# Patient Record
Sex: MALE | Race: WHITE | URBAN - METROPOLITAN AREA
[De-identification: names, ages, dates, MRNs, and addresses within clinical notes are randomized per-mention and may not be internally consistent; named-entity substitution may affect disease eponyms.]

---

## 2017-01-01 ENCOUNTER — NURSING HOME VISIT (OUTPATIENT)
Dept: GERIATRICS | Facility: CLINIC | Age: 82
End: 2017-01-01
Payer: COMMERCIAL

## 2017-01-01 ENCOUNTER — DOCUMENTATION ONLY (OUTPATIENT)
Dept: OTHER | Facility: CLINIC | Age: 82
End: 2017-01-01

## 2017-01-01 ENCOUNTER — NURSING HOME VISIT (OUTPATIENT)
Dept: GERIATRICS | Facility: CLINIC | Age: 82
End: 2017-01-01
Payer: MEDICARE

## 2017-01-01 ENCOUNTER — CLINICAL UPDATE (OUTPATIENT)
Dept: PHARMACY | Facility: CLINIC | Age: 82
End: 2017-01-01

## 2017-01-01 ENCOUNTER — OFFICE VISIT (OUTPATIENT)
Dept: OTOLARYNGOLOGY | Facility: CLINIC | Age: 82
End: 2017-01-01
Payer: COMMERCIAL

## 2017-01-01 VITALS
RESPIRATION RATE: 16 BRPM | TEMPERATURE: 98 F | WEIGHT: 165.4 LBS | BODY MASS INDEX: 25.07 KG/M2 | DIASTOLIC BLOOD PRESSURE: 70 MMHG | HEART RATE: 76 BPM | SYSTOLIC BLOOD PRESSURE: 140 MMHG | OXYGEN SATURATION: 94 % | HEIGHT: 68 IN

## 2017-01-01 VITALS
OXYGEN SATURATION: 95 % | DIASTOLIC BLOOD PRESSURE: 50 MMHG | RESPIRATION RATE: 17 BRPM | WEIGHT: 162 LBS | BODY MASS INDEX: 24.63 KG/M2 | SYSTOLIC BLOOD PRESSURE: 101 MMHG | HEART RATE: 66 BPM | TEMPERATURE: 98.3 F

## 2017-01-01 VITALS
HEART RATE: 66 BPM | RESPIRATION RATE: 18 BRPM | WEIGHT: 161 LBS | TEMPERATURE: 97.8 F | DIASTOLIC BLOOD PRESSURE: 82 MMHG | OXYGEN SATURATION: 92 % | BODY MASS INDEX: 24.48 KG/M2 | SYSTOLIC BLOOD PRESSURE: 128 MMHG

## 2017-01-01 VITALS
WEIGHT: 168 LBS | SYSTOLIC BLOOD PRESSURE: 132 MMHG | TEMPERATURE: 97.1 F | DIASTOLIC BLOOD PRESSURE: 78 MMHG | OXYGEN SATURATION: 96 % | HEART RATE: 59 BPM | BODY MASS INDEX: 25.54 KG/M2 | RESPIRATION RATE: 17 BRPM

## 2017-01-01 VITALS
BODY MASS INDEX: 24.48 KG/M2 | WEIGHT: 161 LBS | HEART RATE: 64 BPM | SYSTOLIC BLOOD PRESSURE: 118 MMHG | DIASTOLIC BLOOD PRESSURE: 60 MMHG | OXYGEN SATURATION: 95 % | RESPIRATION RATE: 18 BRPM | TEMPERATURE: 96.4 F

## 2017-01-01 VITALS
DIASTOLIC BLOOD PRESSURE: 63 MMHG | WEIGHT: 171 LBS | RESPIRATION RATE: 20 BRPM | HEART RATE: 85 BPM | OXYGEN SATURATION: 95 % | SYSTOLIC BLOOD PRESSURE: 100 MMHG | HEIGHT: 68 IN | TEMPERATURE: 99 F | BODY MASS INDEX: 25.91 KG/M2

## 2017-01-01 VITALS
TEMPERATURE: 98.1 F | OXYGEN SATURATION: 94 % | WEIGHT: 164 LBS | DIASTOLIC BLOOD PRESSURE: 75 MMHG | SYSTOLIC BLOOD PRESSURE: 127 MMHG | BODY MASS INDEX: 24.94 KG/M2 | HEART RATE: 64 BPM | RESPIRATION RATE: 17 BRPM

## 2017-01-01 VITALS
OXYGEN SATURATION: 95 % | BODY MASS INDEX: 24.78 KG/M2 | WEIGHT: 163 LBS | TEMPERATURE: 96.4 F | RESPIRATION RATE: 18 BRPM | HEART RATE: 64 BPM | SYSTOLIC BLOOD PRESSURE: 118 MMHG | DIASTOLIC BLOOD PRESSURE: 60 MMHG

## 2017-01-01 VITALS
RESPIRATION RATE: 17 BRPM | WEIGHT: 170 LBS | DIASTOLIC BLOOD PRESSURE: 58 MMHG | OXYGEN SATURATION: 96 % | TEMPERATURE: 96.8 F | SYSTOLIC BLOOD PRESSURE: 101 MMHG | BODY MASS INDEX: 25.85 KG/M2 | HEART RATE: 74 BPM

## 2017-01-01 VITALS
RESPIRATION RATE: 17 BRPM | OXYGEN SATURATION: 96 % | SYSTOLIC BLOOD PRESSURE: 101 MMHG | HEART RATE: 74 BPM | WEIGHT: 151 LBS | TEMPERATURE: 96.8 F | BODY MASS INDEX: 22.96 KG/M2 | DIASTOLIC BLOOD PRESSURE: 58 MMHG

## 2017-01-01 VITALS
SYSTOLIC BLOOD PRESSURE: 140 MMHG | HEART RATE: 76 BPM | OXYGEN SATURATION: 94 % | RESPIRATION RATE: 16 BRPM | TEMPERATURE: 98 F | WEIGHT: 168 LBS | BODY MASS INDEX: 25.54 KG/M2 | DIASTOLIC BLOOD PRESSURE: 70 MMHG

## 2017-01-01 VITALS
OXYGEN SATURATION: 94 % | RESPIRATION RATE: 17 BRPM | DIASTOLIC BLOOD PRESSURE: 75 MMHG | BODY MASS INDEX: 24.94 KG/M2 | WEIGHT: 164 LBS | HEART RATE: 64 BPM | SYSTOLIC BLOOD PRESSURE: 127 MMHG | TEMPERATURE: 98.1 F

## 2017-01-01 VITALS
SYSTOLIC BLOOD PRESSURE: 129 MMHG | OXYGEN SATURATION: 96 % | RESPIRATION RATE: 18 BRPM | TEMPERATURE: 97.2 F | HEART RATE: 69 BPM | HEIGHT: 68 IN | DIASTOLIC BLOOD PRESSURE: 79 MMHG | BODY MASS INDEX: 22.73 KG/M2 | WEIGHT: 150 LBS

## 2017-01-01 VITALS
HEART RATE: 64 BPM | RESPIRATION RATE: 18 BRPM | WEIGHT: 161 LBS | TEMPERATURE: 97.5 F | OXYGEN SATURATION: 95 % | SYSTOLIC BLOOD PRESSURE: 110 MMHG | BODY MASS INDEX: 24.48 KG/M2 | DIASTOLIC BLOOD PRESSURE: 60 MMHG

## 2017-01-01 VITALS
HEART RATE: 69 BPM | SYSTOLIC BLOOD PRESSURE: 121 MMHG | WEIGHT: 160 LBS | DIASTOLIC BLOOD PRESSURE: 57 MMHG | TEMPERATURE: 98 F | BODY MASS INDEX: 24.33 KG/M2

## 2017-01-01 VITALS
SYSTOLIC BLOOD PRESSURE: 120 MMHG | OXYGEN SATURATION: 96 % | WEIGHT: 160 LBS | DIASTOLIC BLOOD PRESSURE: 64 MMHG | HEART RATE: 72 BPM | RESPIRATION RATE: 16 BRPM | BODY MASS INDEX: 24.33 KG/M2 | TEMPERATURE: 97.8 F

## 2017-01-01 VITALS
BODY MASS INDEX: 25.54 KG/M2 | HEART RATE: 59 BPM | RESPIRATION RATE: 17 BRPM | DIASTOLIC BLOOD PRESSURE: 78 MMHG | OXYGEN SATURATION: 96 % | TEMPERATURE: 97.1 F | SYSTOLIC BLOOD PRESSURE: 132 MMHG | WEIGHT: 168 LBS

## 2017-01-01 DIAGNOSIS — Z71.89 ACP (ADVANCE CARE PLANNING): Chronic | ICD-10-CM

## 2017-01-01 DIAGNOSIS — F03.91 DEMENTIA WITH BEHAVIORAL DISTURBANCE, UNSPECIFIED DEMENTIA TYPE: ICD-10-CM

## 2017-01-01 DIAGNOSIS — H61.23 BILATERAL IMPACTED CERUMEN: ICD-10-CM

## 2017-01-01 DIAGNOSIS — H91.93 BILATERAL HEARING LOSS, UNSPECIFIED HEARING LOSS TYPE: ICD-10-CM

## 2017-01-01 DIAGNOSIS — H61.23 BILATERAL IMPACTED CERUMEN: Primary | ICD-10-CM

## 2017-01-01 DIAGNOSIS — F03.91 DEMENTIA WITH BEHAVIORAL DISTURBANCE, UNSPECIFIED DEMENTIA TYPE: Primary | ICD-10-CM

## 2017-01-01 DIAGNOSIS — F43.23 ADJUSTMENT DISORDER WITH MIXED ANXIETY AND DEPRESSED MOOD: ICD-10-CM

## 2017-01-01 DIAGNOSIS — F41.9 ANXIETY: Primary | ICD-10-CM

## 2017-01-01 DIAGNOSIS — R54 FRAIL ELDERLY: ICD-10-CM

## 2017-01-01 DIAGNOSIS — R60.0 BILATERAL LOWER EXTREMITY EDEMA: ICD-10-CM

## 2017-01-01 DIAGNOSIS — B37.9 CANDIDA INFECTION: ICD-10-CM

## 2017-01-01 DIAGNOSIS — B37.2 CANDIDIASIS OF SKIN: ICD-10-CM

## 2017-01-01 DIAGNOSIS — R33.9 URINARY RETENTION: ICD-10-CM

## 2017-01-01 DIAGNOSIS — F41.9 ANXIETY: ICD-10-CM

## 2017-01-01 DIAGNOSIS — R52 GENERALIZED PAIN: ICD-10-CM

## 2017-01-01 DIAGNOSIS — R29.6 FALLS FREQUENTLY: ICD-10-CM

## 2017-01-01 DIAGNOSIS — R33.9 URINARY RETENTION: Primary | ICD-10-CM

## 2017-01-01 DIAGNOSIS — L29.9 ITCHING: ICD-10-CM

## 2017-01-01 DIAGNOSIS — R60.0 BILATERAL LOWER EXTREMITY EDEMA: Primary | ICD-10-CM

## 2017-01-01 DIAGNOSIS — R50.9 FEVER, UNSPECIFIED: ICD-10-CM

## 2017-01-01 DIAGNOSIS — H91.93 BILATERAL HEARING LOSS, UNSPECIFIED HEARING LOSS TYPE: Primary | ICD-10-CM

## 2017-01-01 DIAGNOSIS — R21 RASH: Primary | ICD-10-CM

## 2017-01-01 DIAGNOSIS — T14.8XXA OPEN WOUND: ICD-10-CM

## 2017-01-01 PROCEDURE — 99308 SBSQ NF CARE LOW MDM 20: CPT | Performed by: NURSE PRACTITIONER

## 2017-01-01 PROCEDURE — 99309 SBSQ NF CARE MODERATE MDM 30: CPT | Performed by: FAMILY MEDICINE

## 2017-01-01 PROCEDURE — 99309 SBSQ NF CARE MODERATE MDM 30: CPT | Performed by: NURSE PRACTITIONER

## 2017-01-01 PROCEDURE — 99207 ZZC CDG-CORRECTLY CODED, REVIEWED AND AGREE: CPT | Performed by: NURSE PRACTITIONER

## 2017-01-01 PROCEDURE — 69210 REMOVE IMPACTED EAR WAX UNI: CPT | Mod: 50 | Performed by: OTOLARYNGOLOGY

## 2017-01-01 PROCEDURE — 99310 SBSQ NF CARE HIGH MDM 45: CPT | Performed by: NURSE PRACTITIONER

## 2017-01-01 PROCEDURE — 99310 SBSQ NF CARE HIGH MDM 45: CPT | Performed by: FAMILY MEDICINE

## 2017-01-01 PROCEDURE — 99207 ZZC CDG-CORRECTLY CODED, REVIEWED AND AGREE: CPT | Performed by: FAMILY MEDICINE

## 2017-01-01 PROCEDURE — 99203 OFFICE O/P NEW LOW 30 MIN: CPT | Mod: 25 | Performed by: OTOLARYNGOLOGY

## 2017-01-01 PROCEDURE — 99207 ZZC CDG-DX INCORRECT: CPT | Performed by: NURSE PRACTITIONER

## 2017-01-01 PROCEDURE — 99318 ZZC ANNUAL NURSING FAC ASSESSMNT, STABLE: CPT | Performed by: NURSE PRACTITIONER

## 2017-01-01 RX ORDER — GUAIFENESIN 600 MG/1
600 TABLET, EXTENDED RELEASE ORAL 2 TIMES DAILY PRN
COMMUNITY

## 2017-01-01 RX ORDER — NYSTATIN 100000 [USP'U]/G
POWDER TOPICAL 2 TIMES DAILY
COMMUNITY

## 2017-01-01 ASSESSMENT — PAIN SCALES - GENERAL: PAINLEVEL: NO PAIN (0)

## 2017-02-13 NOTE — PROGRESS NOTES
[place] [facility]  Chief Complaint   Patient presents with     Annual Comprehensive Nursing Home       HPI:    Maninder Loving is a 89 year old  (7/2/1924), who is being seen today for an annual comprehensive visit at [facility] . Today's concerns are:  Dementia with behavioral disturbance, unspecified dementia type  Patient is alert, oriented to self and surroundings with forgetfulness at baseline. Often becomes anxious and wanders about the unit in his wheelchair. He is usually redirectable. He is sometimes resistive to cares but is also redirectable in this.     Urinary retention  No recent UTI, urinates with assist of staff.     Falls frequently  Often forgets that he needs assist and will fall. He has suffered some minor injuries.      Hypertension  BP Readings from Last 6 Encounters:   02/13/17 129/79   12/02/16 105/62   11/30/16 105/62   11/14/16 105/67   10/26/16 124/76   10/17/16 110/57        ALLERGIES: Nka [no known allergies]  PROBLEM LIST:  Patient Active Problem List   Diagnosis     Memory loss     Advanced directives, counseling/discussion     Falls frequently     Falls     Dementia with behavioral disturbance, unspecified dementia type     Delirium     Urinary retention     Cellulitis of great toe of left foot     Localized rash     Bilateral lower extremity edema     PAST MEDICAL HISTORY:  has a past medical history of Arthritis; Dementia; [place] (hard of hearing); and Short-term memory loss. He also has no past medical history of Cancer (H); Congestive heart failure, unspecified; COPD (chronic obstructive pulmonary disease) (H); Coronary artery disease; CVA (cerebral infarction); Depressive disorder; Diabetes (H); History of blood transfusion; Hypertension; Malignant hyperthermia; PONV (postoperative nausea and vomiting); Thyroid disease; or Uncomplicated asthma.  PAST SURGICAL HISTORY:  has a past surgical history that includes adenoidectomy; hernia repair; and Herniorrhaphy  inguinal (Right, 10/22/2015).  FAMILY HISTORY: family history is not on file.  SOCIAL HISTORY:  reports that he has never smoked. He has never used smokeless tobacco. He reports that he drinks alcohol.  IMMUNIZATIONS:  Most Recent Immunizations   Administered Date(s) Administered     Influenza (IIV3) 09/29/2016     Pneumococcal (PCV 13) 09/30/2016     Pneumococcal 23 valent 09/23/2010     TDAP (ADACEL AGES 11-64) 09/23/2010     Above immunizations pulled from [facility]. MIIC and facility records also reconciled. Outstanding information sent to  to update [facility]-current.  Future immunizations are not needed at this point as all recommended immunizations are up to date.   MEDICATIONS:  Current Outpatient Prescriptions   Medication Sig Dispense Refill     erythromycin (ROMYCIN) ophthalmic ointment Place 0.5 inches into both eyes 3 times daily       Furosemide (LASIX PO) Take 20 mg by mouth daily       potassium chloride (KLOR-CON) 20 MEQ Packet Take 10 mEq by mouth daily       hypromellose (ARTIFICIAL TEARS) 0.5 % SOLN Place 2 drops into both eyes 2 times daily       MELATONIN PO Take 5 mg by mouth At Bedtime       senna-docusate (SENOKOT-S;PERICOLACE) 8.6-50 MG per tablet Take 1 tablet by mouth 2 times daily       tamsulosin (FLOMAX) 0.4 MG 24 hr capsule Take 1 capsule (0.4 mg) by mouth daily 60 capsule      COD LIVER OIL PO Take 15 mLs by mouth daily         Medications reconciled to facility chart and changes were made to reflect current medications as identified as above med list. Below are the changes that were made:   Medications stopped since last EPIC medication reconciliation:   There are no discontinued medications.    Medications started since last [facility] medication reconciliation:  No orders of the defined types were placed in this encounter.        Case Management:  I have reviewed the facility/SNF care plan/MDS which was done December 2016, including the falls risk, nutrition and  "pain screening. I also reviewed the current immunizations, and preventive care.. Future cancer screening is not clinically indicated secondary to age/goals of care.   Patient's desire to return to the community is present, but is not able due to care needs .    Advance Directive Discussion:    I reviewed the current advanced directives as reflected in EPIC and the facility chart. I contacted the first party , spouse, and left a message regarding the plan of Care. I reviewed the POLST, resigned, dated and sent to [facility] - will send.  I did not due to cognitive impairment review the advance directives with the resident.     Team Discussion:  I communicated with the appropriate disciplines involved with the Plan of Care:   Nursing    Patient Goal:  Patient's goal is pain control and comfort.    Information reviewed:  Medications, vital signs, orders, and nursing notes.    ROS:  Unobtainable secondary to cognitive impairment or aphasia, but today pt reports no new concerns.     Exam:  /79  Pulse 69  Temp 97.2  F (36.2  C)  Resp 18  Ht 5' 8\" (1.727 m)  Wt 150 lb (68 kg)  SpO2 96%  BMI 22.81 kg/m2    GENERAL APPEARANCE:  Alert, in no distress  HEAD:  Normal, normocephalic, atraumatic  EYE EXAM: normal external eye, conjunctiva, lids, EASTON  NECK EXAM: supple, no JVD  CHEST/RESP:  respiratory effort normal, lung sounds CTA , no respiratory distress  CV:  Rate reg, rhythm reg, no murmur, trace peripheral edema   GI/ABDOMEN:  soft, nontender, no palpable masses  M/S:   extremities normal, gait abnormal-walks only with assist and rolling walker and is unsteady on his feet, normal muscle tone, and range of motion limited by age  NEUROLOGIC EXAM: Normal gross motor movement, tone and coordination. No tremor.  Cranial nerves 2-12 are normal tested and grossly at patient's baseline  PSYCH:  Alert and oriented to self and surroundings with forgetfulness , affect pleasant , judgement impaired by cognitive losses "   PHQ9 4/27  BIMS 3/15    Lab/Diagnostic data:    Reviewed in facility records   Last Basic Metabolic Panel:  Lab Results   Component Value Date     09/26/2016      Lab Results   Component Value Date    POTASSIUM 3.6 09/26/2016     Lab Results   Component Value Date    CHLORIDE 102 09/26/2016     Lab Results   Component Value Date    GILBERT 8.6 09/26/2016     Lab Results   Component Value Date    CO2 31 09/26/2016     Lab Results   Component Value Date    BUN 17 09/26/2016     Lab Results   Component Value Date    CR 0.70 09/26/2016     Lab Results   Component Value Date    GLC 92 09/26/2016          ASSESSMENT/PLAN  Dementia with behavioral disturbance, unspecified dementia type  Chronic condition, ongoing decline expected. Maintain safe living situation with goals focused on comfort.      Urinary retention  Stable, compensated. The current medical regimen is effective;  continue present plan and medications.     Falls frequently  Stable, compensated. The current nursing regimen is effective;  continue present plan.      HYPERTENSION  Based on JNC-8 goals,  patients age of 89 year old, no presence of diabetes or CKD, and goals of care goal BP is <150/90 mm Hg. patient is stable and continue without pharmacological invention with routine assessment.     DEPRESSION  Depression screen done: PHQ-9 Given screen score and clinical assessment patient is stable without any signs of depression and no futher interventions warrented at this time.        Orders:  1.  No New Orders.      Electronically signed by:  Lindsay Brizuela CNP   [facility]  [phone] cell

## 2017-04-03 NOTE — PROGRESS NOTES
[place] [facility]    Chief Complaint   Patient presents with     Nursing Home Acute       HPI:    Maninder Loving is a 89 year old  (7/2/1924), who is being seen today for an episodic care visit at [facility] . Today's concern is:  Here to see patient at request of nursing       Rash  Fever, unspecified  Itching   Here to see patient r/t nursing concerns.  MURTAZA reported to nursing that patient had diffuse rash on thighs and c/o itching.  Attempted to visualize rash per nursing request but patient became very upset and agitated.  Unable to visualize despite several attempts   Per staff, there might be a change in laundry detergent/laundry services and several other residents c/o itching/rash.  Unable to offer residents option of washing clothes/sheets with no detergent     ALLERGIES: Nka [no known allergies]  Past Medical, Surgical, Family and Social History reviewed and updated in [facility].    Current Outpatient Prescriptions   Medication Sig Dispense Refill     erythromycin (ROMYCIN) ophthalmic ointment Place 0.5 inches into both eyes 3 times daily       Furosemide (LASIX PO) Take 20 mg by mouth daily       potassium chloride (KLOR-CON) 20 MEQ Packet Take 10 mEq by mouth daily       hypromellose (ARTIFICIAL TEARS) 0.5 % SOLN Place 2 drops into both eyes 2 times daily       MELATONIN PO Take 5 mg by mouth At Bedtime       senna-docusate (SENOKOT-S;PERICOLACE) 8.6-50 MG per tablet Take 1 tablet by mouth 2 times daily       tamsulosin (FLOMAX) 0.4 MG 24 hr capsule Take 1 capsule (0.4 mg) by mouth daily 60 capsule      COD LIVER OIL PO Take 15 mLs by mouth daily       Medications reviewed:  Medications reconciled to facility chart and changes were made to reflect current medications as identified as above med list. Below are the changes that were made:   Medications stopped since last EPIC medication reconciliation:   There are no discontinued medications.    Medications started since last EPIC  medication reconciliation:  No orders of the defined types were placed in this encounter.        REVIEW OF SYSTEMS:  Unobtainable secondary to cognitive impairment or aphasia.    Physical Exam:  /64  Pulse 72  Temp 97.8  F (36.6  C)  Resp 16  Wt 160 lb (72.6 kg)  SpO2 96%  BMI 24.33 kg/m2  GENERAL APPEARANCE:  Alert, uncooperative  ENT:  [place], oral mucous membranes moist, edentulous  EYES:  EOM normal, conjunctiva and lids normal  M/S:   Gait and station abnormal up in w/c  LANDON  frail    SKIN:  fragile pale w/d  PSYCH:  oriented to self, insight and judgement impaired, memory impaired , anxious    Recent Labs:  All labs reviewed, none recent    Assessment/Plan:     Rash  Fever, unspecified  Itching      Orders:  Monitor    Lotion affected area bid and update if worse    Time  Total time spent with patient visit was 25 min including patient visit and review of past records. Greater than 50% of total time spent with counseling and coordinating care.      Electronically signed by  KORIN Finch CNP

## 2017-04-21 NOTE — PROGRESS NOTES
"  [place] [facility]    Chief Complaint   Patient presents with     long-term Regulatory       HPI:   Obtained from the patient, medical record and from the [facility] staffs.     Maninder Loving is a 89 year old  (7/2/1924), who is being seen today for a federally mandated E/M visit at [facility] .     Today's concerns are:  Urinary retention  No recent UTI, urinates with assist of staff.     Bilateral lower extremity edema  - chronic, no recent change, wears tight stocking. Resident in denial and reports his legs are big because of the big muscle he got from exercise.     Frail elderly  - continues to require assistance with ADLs    Dementia with behavioral disturbance, unspecified dementia type  - \"Resident often becomes anxious and wanders about the unit in his wheelchair. He is usually redirectable. At times resistive to cares but is also redirectable in this.   - Seroquel GDR successful.     __________________________________________________________  ##Past Medical, social, family histories, medications, and allergies reviewed and updated    MEDICATIONS:  Current Outpatient Prescriptions   Medication Sig Dispense Refill     erythromycin (ROMYCIN) ophthalmic ointment Place 0.5 inches into both eyes 3 times daily       Furosemide (LASIX PO) Take 20 mg by mouth daily       potassium chloride (KLOR-CON) 20 MEQ Packet Take 10 mEq by mouth daily       hypromellose (ARTIFICIAL TEARS) 0.5 % SOLN Place 2 drops into both eyes 2 times daily       MELATONIN PO Take 5 mg by mouth At Bedtime       senna-docusate (SENOKOT-S;PERICOLACE) 8.6-50 MG per tablet Take 1 tablet by mouth 2 times daily       tamsulosin (FLOMAX) 0.4 MG 24 hr capsule Take 1 capsule (0.4 mg) by mouth daily 60 capsule      COD LIVER OIL PO Take 15 mLs by mouth daily       Medications reviewed: Reviewed in the chart and EHR.      Case Management:  I have reviewed the care plan and MDS and do agree with the plan. Patient's desire to " return to the community is not present.  Information reviewed:  Medications, vital signs, orders, and nursing notes.    ROS:  10 point ROS of systems including Constitutional, Eyes, Respiratory, Cardiovascular, Gastroenterology, Genitourinary, Integumentary, Muscularskeletal, Psychiatric were all negative except for pertinent positives noted in my HPI.    Exam:  /58  Pulse 74  Temp 96.8  F (36  C)  Resp 17  Wt 151 lb (68.5 kg)  SpO2 96%  BMI 22.96 kg/m2   GENERAL APPEARANCE:  awake, siting on the wheel chair.  ENT:  Mouth and posterior oropharynx normal, moist mucous membranes  EYES:  EOM, congested conjunctivae with some purulent discharge, lids, pupils and irises normal  NECK:  No adenopathy,masses or thyromegaly  RESP:  respiratory effort and palpation of chest normal, lungs clear to auscultation   CV:  Palpation and auscultation of heart done , regular rate and rhythm, no murmur, rub, or gallop, no edema  ABDOMEN:  normal bowel sounds, soft, nontender, no hepatosplenomegaly or other masses  M/S:   Gait and station abnormal Unsteady, on the wheel chair. Moves all limbs. 1+ pitting pedal edema.   SKIN:  Inspection of skin and subcutaneous tissue baseline, Palpation of skin and subcutaneous tissue baseline. Erythema at the distal legs b/l.   NEURO:   CN testing limited due to patient's mentation. Resting tremor in right hand.   PSYCH:  oriented to name. Mood and affect appropriate.     Lab/Diagnostic data:  All labs reviewed, none recent    ASSESSMENT/PLAN  Urinary retention  - no issue, stable. On tamsulosin.     Bilateral lower extremity edema  - chronic, on lasix 20 mg. Continue to encourage Resident to raise up legs and to keep tight stocking in place.     Frail elderly  - Significant  Deficits requiring NH placement. Requiring extensive assistance from nursing. Up for meals only o/w spends the day resting in bed      Dementia with behavioral disturbance, unspecified dementia type  - Continue to  anticipate needs. Chronic condition, ongoing decline expected.   -  Continue to provide redirection and reassurance as needed. Maintain safe living situation with goals focused on comfort      Orders:  - The current medical regimen is effective;  continue present plan and medications.      Total time spent with patient visit was 35 min including patient visit and review of past records.      Electronically signed by:  Dyana Guerrero MD

## 2017-04-27 NOTE — PATIENT INSTRUCTIONS
Orders:  1.  Discontinue Lasix 20 mg and potassium 10 meq.  2.  Lasix 40 mg po qd.  3.  Potassium 20 meq po qd.  4.  Discontinue lambs wool between toes.  5.  BMP 5/1/17

## 2017-04-27 NOTE — PROGRESS NOTES
[place] [facility]    Chief Complaint   Patient presents with     Nursing Home Acute       HPI:    Maninder Loving is a 89 year old  (7/2/1924), who is being seen today for an episodic care visit at [facility] . Today's concern is:  Bilateral lower extremity edema  Today, he is wearing his compression garments and offers no complaints of pain.     Dementia with behavioral disturbance, unspecified dementia type  Able to make needs known, often refuses personal cares, refuses to lie down in bed at night.        ALLERGIES: Nka [no known allergies]  Past Medical, Surgical, Family and Social History reviewed and updated in [facility].    Current Outpatient Prescriptions   Medication Sig Dispense Refill     erythromycin (ROMYCIN) ophthalmic ointment Place 0.5 inches into both eyes 3 times daily       Furosemide (LASIX PO) Take 20 mg by mouth daily       potassium chloride (KLOR-CON) 20 MEQ Packet Take 10 mEq by mouth daily       hypromellose (ARTIFICIAL TEARS) 0.5 % SOLN Place 2 drops into both eyes 2 times daily       MELATONIN PO Take 5 mg by mouth At Bedtime       senna-docusate (SENOKOT-S;PERICOLACE) 8.6-50 MG per tablet Take 1 tablet by mouth 2 times daily       tamsulosin (FLOMAX) 0.4 MG 24 hr capsule Take 1 capsule (0.4 mg) by mouth daily 60 capsule      COD LIVER OIL PO Take 15 mLs by mouth daily          REVIEW OF SYSTEMS:  Unobtainable secondary to cognitive impairment or aphasia, but today pt reports no new concerns.     The health plan product change has happened. I have reviewed the  MDS, the preventative needs,  and facility care plan. The level of care is appropriate. I have reviewed the code status/advanced directives.      PHYSICAL EXAM:  /58  Pulse 74  Temp 96.8  F (36  C)  Resp 17  Wt 170 lb (77.1 kg)  SpO2 96%  BMI 25.85 kg/m2  GENERAL APPEARANCE:  Alert, in no distress  HEAD:  Normal, normocephalic, atraumatic  EYE EXAM: normal external eye, conjunctiva, lids, [place]  NECK  EXAM: stiff, no JVD  CHEST/RESP:  respiratory effort normal, lung sounds CTA , no respiratory distress  CV:  Rate reg, rhythm reg, no murmur, 1+ peripheral edema bilateral LE   NEUROLOGIC EXAM: Normal gross motor movement, tone and coordination. No tremor.  Cranial nerves 2-12 are normal tested and grossly at patient's baseline  PSYCH:  Alert and oriented to self with forgetfulness, affect pleasant , judgement impaired by cognitive losses     RECENT LABS:  Reviewed in facility records     ASSESSMENT/PLAN:  Bilateral lower extremity edema  Stable today, and wears compression garments. Will increase lasix and monitor weights.     Dementia with behavioral disturbance, unspecified dementia type  Chronic condition, ongoing decline expected. Maintain safe living situation with goals focused on comfort.        ORDERS:  1.  Discontinue Lasix 20 mg and potassium 10 meq.  2.  Lasix 40 mg po qd.  3.  Potassium 20 meq po qd.  4.  Discontinue lambs wool between toes.  5.  BMP 5/1/17      Total time spent with patient visit was 25 min including patient visit and review of past records   Greater than 50% of total time spent with counseling and coordinating care    Electronically signed by  Lindsay Brizuela CNP   [facility]  [phone] cell

## 2017-05-11 NOTE — PATIENT INSTRUCTIONS
Orders:  1.  Lorazepam 0.5 mg1 tab po q 4 hours prn anxiety.  2.  Discontinue Tyl 1000 q 8.  3.  Tylenol 1000 mg po tid for pain.  4.  Discontinue Lasix.  5.  Lasix 40 mg po bid for LE edema.  6.  Discontinue potassium 20  Meq qd.  7.  Potassium 20 meq qd for supplement.

## 2017-05-11 NOTE — PROGRESS NOTES
[place] [facility]    Chief Complaint   Patient presents with     Nursing Home Acute       HPI:    Maninder Loving is a 89 year old  (7/2/1924), who is being seen today for an episodic care visit at [facility] . Today's concern is:  Dementia with behavioral disturbance, unspecified dementia type  Chronic refusal of cares, transfers, and assistance with personal cares. Refuses to sleep in a bed at night but is sleeping up in his wheelchair.     Bilateral lower extremity edema  Noted to have stable weight but increased LE Edema with weeping, no dyspnea, no orthopnea     ALLERGIES: Nka [no known allergies]  Past Medical, Surgical, Family and Social History reviewed and updated in [facility].    Current Outpatient Prescriptions   Medication Sig Dispense Refill     erythromycin (ROMYCIN) ophthalmic ointment Place 0.5 inches into both eyes 3 times daily       Furosemide (LASIX PO) Take 40 mg by mouth daily        potassium chloride (KLOR-CON) 20 MEQ Packet Take 20 mEq by mouth daily        hypromellose (ARTIFICIAL TEARS) 0.5 % SOLN Place 2 drops into both eyes 2 times daily       MELATONIN PO Take 5 mg by mouth At Bedtime       senna-docusate (SENOKOT-S;PERICOLACE) 8.6-50 MG per tablet Take 1 tablet by mouth 2 times daily       tamsulosin (FLOMAX) 0.4 MG 24 hr capsule Take 1 capsule (0.4 mg) by mouth daily 60 capsule      COD LIVER OIL PO Take 15 mLs by mouth daily         Medications reconciled to facility chart and changes were made to reflect current medications as identified as above med list. Below are the changes that were made:   Medications stopped since last EPIC medication reconciliation:   There are no discontinued medications.    Medications started since last [facility] medication reconciliation:  No orders of the defined types were placed in this encounter.      REVIEW OF SYSTEMS:  4 point ROS including Respiratory, CV, GI and , other than that noted in the HPI,  is negative    PHYSICAL  EXAM:  /78  Pulse 59  Temp 97.1  F (36.2  C)  Resp 17  Wt 168 lb (76.2 kg)  SpO2 96%  BMI 25.54 kg/m2  GENERAL APPEARANCE:  Alert, in no distress  HEAD:  Normal, normocephalic, atraumatic  EYE EXAM: normal external eye, conjunctiva, lids, EAST[place]  CHEST/RESP:  respiratory effort normal, lung sounds CTA , no respiratory distress  CV:  Rate reg, rhythm reg, no murmur, 3-4+ pitting peripheral edema with weeping of toes/feet, macerated  M/S:   extremities abnormal, gait abnormal-does not ambulate and uses wheelchair for mobility, normal muscle tone, and range of motion normal   PSYCH:  Alert and oriented to self with forgetfulness, affect anxious, judgement impaired by cognitive losses     RECENT LABS:  Reviewed in facility records     ASSESSMENT/PLAN:  Dementia with behavioral disturbance, unspecified dementia type  Chronic condition, ongoing decline expected. Maintain safe living situation with goals focused on comfort.  Goals of care are focused on comfort. He is not always receptive to cares, seems anxious. Family in agreement that he is anxious and would like to trial some anxiety medicine for better control of anxiety, allow personal cares.     Bilateral lower extremity edema  BNP at last lab draw negative for CHF, likely this is dependent edema as he will not elevate legs nor sleep in a bed at night. No orthopnea, no dyspnea, LS CTA. He is not cooperative with cares.       ORDERS:  1.  Lorazepam 0.5 mg1 tab po q 4 hours prn anxiety.  2.  Discontinue Tyl 1000 q 8.  3.  Tylenol 1000 mg po tid for pain.  4.  Discontinue Lasix.  5.  Lasix 40 mg po bid for LE edema.  6.  Discontinue potassium 20  Meq qd.  7.  Potassium 20 meq qd for supplement.      Total time spent with patient visit was 25 min including patient visit and review of past records   Greater than 50% of total time spent with counseling and coordinating care    Electronically signed by  Lindsay Brizuela CNP   [place]  [phone]  cell

## 2017-05-15 NOTE — PROGRESS NOTES
[facility]    Chief Complaint   Patient presents with     Nursing Home Acute       HPI:    Maninder Loving is a 89 year old  (7/2/1924), who is being seen today for an episodic care visit at [facility] . Today's concern is:  Bilateral lower extremity edema  Open wound  Anxiety  Nursing staff request evaluation of bilateral LE edema, reports of macerated skin to toes,a nd ongoing weeping of skin. Maninder continues to be reluctant to elevate his feet much of the time, refuses to sleep in bed and sits in his wheelchair almost all of the time. Refuses to sit in his recliner either. Now does have tilting wheelchair, but often refuses to allow it to be tilted.        ALLERGIES: Nka [no known allergies]  Past Medical, Surgical, Family and Social History reviewed and updated in EPIC.    Current Outpatient Prescriptions   Medication Sig Dispense Refill     LORAZEPAM PO Take 0.5 mg by mouth every 4 hours as needed for anxiety       Acetaminophen (TYLENOL PO) Take 1,000 mg by mouth 3 times daily       erythromycin (ROMYCIN) ophthalmic ointment Place 0.5 inches into both eyes 3 times daily       Furosemide (LASIX PO) Take 40 mg by mouth 2 times daily        potassium chloride (KLOR-CON) 20 MEQ Packet Take 20 mEq by mouth daily        hypromellose (ARTIFICIAL TEARS) 0.5 % SOLN Place 2 drops into both eyes 2 times daily       MELATONIN PO Take 5 mg by mouth At Bedtime       senna-docusate (SENOKOT-S;PERICOLACE) 8.6-50 MG per tablet Take 1 tablet by mouth 2 times daily       tamsulosin (FLOMAX) 0.4 MG 24 hr capsule Take 1 capsule (0.4 mg) by mouth daily 60 capsule      COD LIVER OIL PO Take 15 mLs by mouth daily         REVIEW OF SYSTEMS:  Unobtainable secondary to cognitive impairment or aphasia.    PHYSICAL EXAM:  /78  Pulse 59  Temp 97.1  F (36.2  C)  Resp 17  Wt 168 lb (76.2 kg)  SpO2 96%  BMI 25.54 kg/m2  GENERAL APPEARANCE:  Alert, in no acute distress  HEAD:  Normal, normocephalic,  atraumatic  EYE EXAM: normal external eye, conjunctiva, lids, EASTON  NECK EXAM: stiff, no JVD  CHEST/RESP:  respiratory effort normal, lung sounds CTA , no respiratory distress  CV:  Rate reg, rhythm reg, no murmur, 1+ peripheral edema   SKIN EXAM: bilateral LE with faint redness and scaling of skin from ankles to knees. Bilateral feet with whitish, macerated skin of toes, obvious wetness of both socks which is likely due to weeping though sweating of feet cannot be ruled out. Shoes foul smelling and both socks wet.   NEUROLOGIC EXAM: Normal gross motor movement, tone and coordination. No tremor.  Cranial nerves 2-12 are normal tested and grossly at patient's baseline  PSYCH:  Alert and unable to determine orientation due to cognitive impairment , sleeping today after lunch in his wheelchair.     RECENT LABS:  Reviewed in facility records       ASSESSMENT/PLAN:  Bilateral lower extremity edema  Weight down about 5 pounds since last week, feet less swollen, legs remain red and scaly.     Open wound  Bilateral feet with macerated, flaking skin of toes, no obvious open areas but are moist with either sweat or weeping interstitial fluid.     Anxiety  Stable, rare lorazepam use since start of this. Today he is sleeping soundly in his wheelchair after lunch, and wheelchair successfully tilted back and leg rests applied       ORDERS:  1.  Limit Aquaphor to legs only--not feet.  2.  Cleanse feet gently with wash cloth and dry thouroughly every shift.  3.  Weave fluffed 4x4 between toes  4.  Change socks and remove shoes every shift and allow feet to air out.  5.  DC Lasix 40 BID  6.  Lasix 60mg BID PO dx:  LE edema  7.  BMP 5/18/17 dx:  LE edema      Total time spent with patient visit was 35 min including patient visit , review of records.   Greater than 50% of total time spent with counseling and coordinating care    Electronically signed by  Lindsay rBizuela CNP   [facility]  [phone] cell

## 2017-05-22 NOTE — PROGRESS NOTES
[facility]    Chief Complaint   Patient presents with     Nursing Home Acute       HPI:    Maninder Loving is a 89 year old  (7/2/1924), who is being seen today for an episodic care visit at [facility] .  HPI information obtained from: facility chart records, facility staff and patient report.Today's concern is:  Bilateral lower extremity edema  On lasix, no new pain. Nursing notes open areas on bilateral LE which need evaluation.     Candida infection  Of skin in groin/under arms. He does not have complaints of pain.       ALLERGIES: Nka [no known allergies]  Past Medical, Surgical, Family and Social History reviewed and updated in [facility].    Current Outpatient Prescriptions   Medication Sig Dispense Refill     Polyethyl Glycol-Propyl Glycol (SYSTANE PRESERVATIVE FREE OP) Apply 2 drops to eye 2 times daily And BID PRN       VITAMIN D, CHOLECALCIFEROL, PO Take 2,000 Units by mouth daily       LORAZEPAM PO Take 0.5 mg by mouth every 4 hours as needed for anxiety       Acetaminophen (TYLENOL PO) Take 1,000 mg by mouth 3 times daily       Furosemide (LASIX PO) Take 60 mg by mouth 2 times daily        potassium chloride (KLOR-CON) 20 MEQ Packet Take 40 mEq by mouth daily        hypromellose (ARTIFICIAL TEARS) 0.5 % SOLN Place 2 drops into both eyes 2 times daily       senna-docusate (SENOKOT-S;PERICOLACE) 8.6-50 MG per tablet Take 1 tablet by mouth 2 times daily as needed        tamsulosin (FLOMAX) 0.4 MG 24 hr capsule Take 1 capsule (0.4 mg) by mouth daily 60 capsule      COD LIVER OIL PO Take 15 mLs by mouth daily       Medications reviewed:  Medications reconciled to facility chart and changes were made to reflect current medications as identified as above med list. Below are the changes that were made:   Medications stopped since last EPIC medication reconciliation:   Medications Discontinued During This Encounter   Medication Reason     MELATONIN PO      erythromycin (ROMYCIN)  ophthalmic ointment        Medications started since last [facility] medication reconciliation:  Orders Placed This Encounter   Medications     Polyethyl Glycol-Propyl Glycol (SYSTANE PRESERVATIVE FREE OP)     Sig: Apply 2 drops to eye 2 times daily And BID PRN     VITAMIN D, CHOLECALCIFEROL, PO     Sig: Take 2,000 Units by mouth daily         REVIEW OF SYSTEMS:  Unobtainable secondary to cognitive impairment or aphasia, but today pt reports no new concerns.     Physical Exam:  /82  Pulse 66  Temp 97.8  F (36.6  C)  Resp 18  Wt 161 lb (73 kg)  SpO2 92%  BMI 24.48 kg/m2  GENERAL APPEARANCE:  Alert, in no distress, uncooperative  RESP:  respiratory effort and palpation of chest normal, lungs clear to auscultation , no respiratory distress  CV:  Palpation and auscultation of heart done , regular rate and rhythm, no murmur, rub, or gallop, 3+ bilateral LE edema  M/S:   Gait and station abnormal only occasional ambualtion and uses wc for all mobility-he rarely lies in bed.   SKIN:  Bilateral LE with swelling 2-3 +, feet puffy, weeping open areas on L calf/shin and blisters on R LE. He has refused his compression garments and to recline with feet elevated. Foot pedals fit poorly on wheelchair.   PSYCH:  memory impaired , refuses care today    Recent Labs:    Date:  5/18/17  Na 139, K+ 3.7, BUN 39, Creat 1.02, eGFR >60  Ca 8.9     Assessment/Plan:  Bilateral lower extremity edema  Ongoing bilateral LE edema , mostly due to dependent sitting and his frequent refusals to lie flat/elevate legs/lie down    Candida infection  Reddened groin by report, did not allow observation of this today. Will have nursing use nystatin powder.       Orders:  1. Hold compression garments for now.  2. Skin care both legs daily and prn to open areas- cover open area with abd pad or telfa, wrap with kerlix, no tape to skin.  3. ACE wraps toes to knees on 24 hr/day, release ace wraps 2xday to check skin and re dress open areas as  needed.    Total time spent with patient visit at the skilled nursing facility was 25 min including patient visit and review of past records. Greater than 50% of total time spent with counseling and coordinating care due to poor cognition    Electronically signed by  KORIN Watson CNP

## 2017-05-26 NOTE — PROGRESS NOTES
This patient's medication list and chart were reviewed as part of the service provided by [facility].    Assessment/Recommendations:  1. (Anxiety):  Please consider d'c of prn Lorazepam.  Rarely used, so likely not necessary, and use increases risk of confusion, delirium, falls/fractures, and pt with history of dementia. Could be that the scheduled Tylenol is indirectly offering some relief to patient if pt was having pain difficult to vocalize.  If agent felt necessary, could consider small dose of SSRI such as Citalopram 5 or 10mg daily.      Ale Comer, Pharm.D.,[facility]  Board Certified Geriatric Pharmacist  Medication Therapy Management Pharmacist  [phone]

## 2017-06-15 NOTE — PROGRESS NOTES
[facility]    Chief Complaint   Patient presents with     correction Regulatory       HPI:    Maninder Loving is a 89 year old  (7/2/1924), who is being seen today for a federally mandated E/M visit at [facility] .  HPI information obtained from: facility chart records and facility staff. Today's concerns are:  Anxiety  Is occasionally using lorazepam and nursing staff notes anecdotal relief of anxiety associated with personal cares, situational events on the unit.     Dementia with behavioral disturbance, unspecified dementia type  He is alert at times, able to state his name, but is disoriented to place and time. He is often resistive to personal cares and I think he does not understand.     Edema  Is usually wearing compression garments on bilateral LE. Does refuse to wear them at times. Also often refuses to lie in bed and sits up in reclining wheelchair to sleep.       ALLERGIES: Nka [no known allergies]  PAST MEDICAL HISTORY:  has a past medical history of Arthritis; Dementia; [place] (hard of hearing); and Short-term memory loss. He also has no past medical history of Cancer (H); Congestive heart failure, unspecified; COPD (chronic obstructive pulmonary disease) (H); Coronary artery disease; CVA (cerebral infarction); Depressive disorder; Diabetes (H); History of blood transfusion; Hypertension; Malignant hyperthermia; PONV (postoperative nausea and vomiting); Thyroid disease; or Uncomplicated asthma.  PAST SURGICAL HISTORY:  has a past surgical history that includes adenoidectomy; hernia repair; and Herniorrhaphy inguinal (Right, 10/22/2015).  FAMILY HISTORY: family history is not on file.  SOCIAL HISTORY:  reports that he has never smoked. He has never used smokeless tobacco. He reports that he drinks alcohol.    MEDICATIONS:  Current Outpatient Prescriptions   Medication Sig Dispense Refill     Polyethyl Glycol-Propyl Glycol (SYSTANE PRESERVATIVE FREE OP) Apply 2 drops to eye  "2 times daily And BID PRN       VITAMIN D, CHOLECALCIFEROL, PO Take 2,000 Units by mouth daily       LORAZEPAM PO Take 0.5 mg by mouth every 4 hours as needed for anxiety       Acetaminophen (TYLENOL PO) Take 1,000 mg by mouth 3 times daily       Furosemide (LASIX PO) Take 60 mg by mouth 2 times daily        potassium chloride (KLOR-CON) 20 MEQ Packet Take 40 mEq by mouth daily        hypromellose (ARTIFICIAL TEARS) 0.5 % SOLN Place 2 drops into both eyes 2 times daily       senna-docusate (SENOKOT-S;PERICOLACE) 8.6-50 MG per tablet Take 1 tablet by mouth 2 times daily as needed        tamsulosin (FLOMAX) 0.4 MG 24 hr capsule Take 1 capsule (0.4 mg) by mouth daily 60 capsule      COD LIVER OIL PO Take 15 mLs by mouth daily         Case Management:  I have reviewed the care plan and MDS and do agree with the plan. Patient's desire to return to the community is present, but is not able due to care needs .  Information reviewed:  Medications, vital signs, orders, and nursing notes.    ROS:  Unobtainable secondary to cognitive impairment or aphasia.    Exam:  Vitals: /63  Pulse 85  Temp 99  F (37.2  C)  Resp 20  Ht 5' 8\" (1.727 m)  Wt 171 lb (77.6 kg)  SpO2 95%  BMI 26 kg/m2  BMI= Body mass index is 26 kg/(m^2).  GENERAL APPEARANCE:  Asleep, in no distress  HEAD:  Normal, normocephalic, atraumatic  EYE EXAM: normal external eye, conjunctiva, lids, EASTON  NECK EXAM: stiff, no JVD  CHEST/RESP:  respiratory effort normal, lung sounds CTA , no respiratory distress  CV:  Rate reg, rhythm reg, no murmur, no peripheral edema wearing compression garments.   M/S:   extremities normal, gait abnormal-unable to ambulate, normal muscle tone, and range of motion limited by dementia and complaints of pain  NEUROLOGIC EXAM: Normal gross motor movement, tone and coordination. No tremor.  Cranial nerves 2-12 are normal tested and grossly at patient's baseline  PSYCH:  Asleep today and unable to awaken, unable to determine " orientation due to cognitive impairment , fatigue    Lab/Diagnostic data:  Reviewed in facility records       ASSESSMENT/PLAN  Anxiety  Continues with occasional anxiety, appreciate recomendations of pharmacy review. However, it does appear to be helping him, used rarely (6-8 times so far this month). He is not at risk of falls as he no longer ambulates. Will continue, but consider addition of celexa if family agrees    Dementia with behavioral disturbance, unspecified dementia type  Chronic condition, ongoing decline expected. Maintain safe living situation with goals focused on comfort.      Edema  Stable, weight stable. This is complicated by his refusal to elevate legs or sleep in a bed at night. Monitor closely.       Orders:  No new orders       Electronically signed by:  Lindsay Brizuela CNP   [facility]  [phone] cell

## 2017-06-16 PROBLEM — F41.9 ANXIETY: Status: ACTIVE | Noted: 2017-01-01

## 2017-06-26 NOTE — PROGRESS NOTES
[facility]    Chief Complaint   Patient presents with     Nursing Home Acute       HPI:    Maninder Loving is a 89 year old  (7/2/1924), who is being seen today for an episodic care visit at [facility] .  HPI information obtained from: facility chart records, facility staff and patient report.Today's concern is:  Dementia with behavioral disturbance, unspecified dementia type  Anxiety  These are impacting his cares, as he becomes angry, fearful, and hits/kicks/pulls hair/yells at his caregivers. He often refuses all personal cares and will not lie down in bed.     Bilateral lower extremity edema  Chronic, often refuses to lie down in bed. Refuses compression garments.       ALLERGIES: Nka [no known allergies]  Past Medical, Surgical, Family and Social History [facility].    Current Outpatient Prescriptions   Medication Sig Dispense Refill     Polyethyl Glycol-Propyl Glycol (SYSTANE PRESERVATIVE FREE OP) Apply 2 drops to eye 2 times daily And BID PRN       VITAMIN D, CHOLECALCIFEROL, PO Take 2,000 Units by mouth daily       LORAZEPAM PO Take 0.5 mg by mouth every 4 hours as needed for anxiety       Acetaminophen (TYLENOL PO) Take 1,000 mg by mouth 3 times daily       Furosemide (LASIX PO) Take 60 mg by mouth 2 times daily        potassium chloride (KLOR-CON) 20 MEQ Packet Take 40 mEq by mouth daily        senna-docusate (SENOKOT-S;PERICOLACE) 8.6-50 MG per tablet Take 1 tablet by mouth 2 times daily as needed        tamsulosin (FLOMAX) 0.4 MG 24 hr capsule Take 1 capsule (0.4 mg) by mouth daily 60 capsule      COD LIVER OIL PO Take 15 mLs by mouth daily       Medications reviewed:  Medications reconciled to facility chart and changes were made to reflect current medications as identified as above med list. Below are the changes that were made:   Medications stopped since last EPIC medication reconciliation:   Medications Discontinued During This Encounter   Medication  Reason     hypromellose (ARTIFICIAL TEARS) 0.5 % SOLN        Medications started since last [facility] medication reconciliation:  No orders of the defined types were placed in this encounter.        REVIEW OF SYSTEMS:  Unobtainable secondary to cognitive impairment or aphasia.    Physical Exam:  /50  Pulse 66  Temp 98.3  F (36.8  C)  Resp 17  Wt 162 lb (73.5 kg)  SpO2 95%  BMI 24.63 kg/m2  GENERAL APPEARANCE:  Alert, in no distress, unable to hear and answer simple questions.   HEAD:  Normal, normocephalic, atraumatic  EYE EXAM: normal external eye, conjunctiva, lids, EASTON  NECK EXAM: stiff, no JVD  CHEST/RESP:  respiratory effort normal, lung sounds CTA , no respiratory distress  CV:  Rate reg, rhythm reg, no murmur, 1-2+ peripheral edema   M/S:   extremities normal, gait abnormal-unable to ambulate and in wheelchair most of every day, normal muscle tone, and range of motion limited by pain and resistance  SKIN EXAM: bilateral feet are CDI, mild edema, no open areas.   NEUROLOGIC EXAM: Normal gross motor movement, tone and coordination. No tremor.  Cranial nerves 2-12 are normal tested and grossly at patient's baseline  PSYCH:  Alert and unable to determine orientation due to cognitive impairment and hearing deficit.     Recent Labs:  All labs revewed, none recnent    Assessment/Plan:  Dementia with behavioral disturbance, unspecified dementia type  Anxiety  Dementia is likely contributing to anxiety, spouse reports that he has expressed feelings of sadness to her. He is very resistive to cares much of the time, anxious that someone is trying to kill him. Will trial antidepressant to relieve anxiety, monitor. Spouse agrees.     Bilateral lower extremity edema  Ongoing and chronic, likely in part due to dependence. The current medical regimen is effective;  continue present plan and medications.       Orders:  1. Saline nasal spray 1 spray both nostrils QID prn Dx Dry nares  2. Remeron (mirtazipine) 7.5 mg po  QHS Dx Depression with anxiety (wife informed and agrees)    Phone call to wife with above concerns. She verbalizes agreement to the plan.       Electronically signed by  Lindsay Brizuela CNP   [facility]  [phone] cell

## 2017-07-17 PROBLEM — B37.2 CANDIDIASIS OF SKIN: Status: ACTIVE | Noted: 2017-01-01

## 2017-07-17 NOTE — PROGRESS NOTES
[facility]    Chief Complaint   Patient presents with     Nursing Home Acute       HPI:    Maninder Loving is a 89 year old  (7/2/1924), who is being seen today for an episodic care visit at [facility] .  HPI information obtained from: facility chart records, facility staff and patient report.Today's concern is:     Dementia with behavioral disturbance, unspecified dementia type  Anxiety  Generalized pain  Bilateral lower extremity edema  Candidiasis of skin     Maninder continues to have difficulty with transfers and in allowing personal cares. He reports significant pain in the shoulders when using the PAL lift, reports that it is a torture device. He often refuses personal cares, often is combative with any attempt at personal cares, toileting, and even to lie down in bed at night. He is perseverative in his speech. He is very hard of hearing, does not appear to understand things around him.     Did trial low dose remeron to see if this would help him sleep at night, help him relax and be less anxious during the day. However, he took only a few doses and spouse felt he was too sleepy, would not allow it to continue. He does take prn ativan, which is in the only medication she will allow at this time.     Started low dose oxycodone 2.5 mg only at hs last week, no significant change in pain tolerance or personal care tolerance with this very low dose, but also no somnolence.     Nursing reports bilateral redness under breast tissue/top of abdomen not controlled with nystatin powder. He often refuses adequate personal cares.     ALLERGIES: Nka [no known allergies]  Past Medical, Surgical, Family and Social History reviewed and updated in [facility].    Current Outpatient Prescriptions   Medication Sig Dispense Refill     OXYCODONE HCL PO Take 5 mg by mouth See Admin Instructions Give 2.5 mg po QHS       carbamide peroxide (DEBROX) 6.5 % otic solution Place 5 drops into both ears 2 times daily        sodium chloride (OCEAN) 0.65 % nasal spray Spray 1 spray into both nostrils 4 times daily as needed for congestion       Polyethyl Glycol-Propyl Glycol (SYSTANE PRESERVATIVE FREE OP) Apply 2 drops to eye 2 times daily And BID PRN       VITAMIN D, CHOLECALCIFEROL, PO Take 2,000 Units by mouth daily       LORAZEPAM PO Take 0.5 mg by mouth every 4 hours as needed for anxiety       Acetaminophen (TYLENOL PO) Take 1,000 mg by mouth 3 times daily       Furosemide (LASIX PO) Take 60 mg by mouth 2 times daily        potassium chloride (KLOR-CON) 20 MEQ Packet Take 40 mEq by mouth daily        senna-docusate (SENOKOT-S;PERICOLACE) 8.6-50 MG per tablet Take 1 tablet by mouth 2 times daily as needed        tamsulosin (FLOMAX) 0.4 MG 24 hr capsule Take 1 capsule (0.4 mg) by mouth daily 60 capsule      COD LIVER OIL PO Take 15 mLs by mouth daily         REVIEW OF SYSTEMS:  Unobtainable secondary to cognitive impairment or aphasia, but today pt reports pain in shoulders, mild pain in the knees.     Physical Exam:  /60  Pulse 64  Temp 96.4  F (35.8  C)  Resp 18  Wt 161 lb (73 kg)  SpO2 95%  BMI 24.48 kg/m2  GENERAL APPEARANCE:  Alert, in no distress  HEAD:  Normal, normocephalic, atraumatic  EYE EXAM: normal external eye, conjunctiva, lids, [place]  NECK EXAM: stiff, no JVD  CHEST/RESP:  respiratory effort normal, lung sounds CTA , no respiratory distress  CV:  Rate reg, rhythm reg, no murmur, trace peripheral edema, not wearing compression garments.   M/S:   extremities normal, gait abnormal-unable to ambulate and uses wheelchair for all mobility, Stand-PAL for transfers, normal muscle tone, and range of motion baseline. R shoulder stiff with crepitus, decreased mobility to just above 90 degrees, L shoulder with more mobility, less pain.   SKIN EXAM: bilateral folds under the breast tissue/top of abdomen with reddened, yeasty smelling skin, tender to touch. Scabs noted on bilateral shins, mild weeping.   NEUROLOGIC  EXAM: Normal gross motor movement, tone and coordination. No tremor.  Cranial nerves 2-12 are normal tested and grossly at patient's baseline  PSYCH:  Alert and oriented to self, poor hearing limits ability to determine cognition, affect anxious, fretful, judgement impaired by cognitive losses     Recent Labs:  All labs reviewed, none recent    Assessment/Plan:  Dementia with behavioral disturbance, unspecified dementia type  He is alert, recognizes his spouse. It is difficult to determine level of disorientation, but he is quite confused and refuses personal cares. Last BIMS 3/15.     Anxiety  Ongoing, perseverative in thoughts. Minimal benefit from use of lorazepam, spouse is reluctant to try other medications. His hearing limits his ability to understand what is going on around him.     Generalized pain  Reports pain  In R shoulder, worse with transfers, some pain in knees. I placed call to spouse x2 today to discuss pain control and refusal of cares, message left x 2. He may benefit from increase in oxycodone, will await return phone call from her. I discussed with nursing staff the transfer with PAL lift, questioned need for danilo to reduce strain on shoulders. If he is danilo transferred, he would not be able to use the toilet any longer-this would greatly impact his quality of life. They report that at times, his transfer is adequate-it seems to be when he understands what to do.     Bilateral lower extremity edema  Weight stable, minimal LE edema. Mild scabs on bilateral legs.   Wt Readings from Last 4 Encounters:   07/17/17 161 lb (73 kg)   06/26/17 162 lb (73.5 kg)   06/15/17 171 lb (77.6 kg)   05/22/17 161 lb (73 kg)        Candidiasis of skin  Poor personal hygiene due to his refusal of cares much of the time, likely compounds this problem. Will trial 4x4's in skin folds, with nystatin powder to help the skin stay dry. If this is ineffective, may need some inter-dri fabric for better moisture control.      He is very hard of hearing and this compounds his confusion and I believe he misunderstands what staff is doing to help him. I did communicate with simple sentences written on paper today, and he did understand that a bit more. He does continue to have significant confusion/dementia, but his [place] also complicates this. Will trial a white board for communication, see if this helps him understand what is happening around him.       Orders:  1. Please try to use white board to communicate tasks to allow him to understand next steps and cares.  2. 4 X 4's to breast folds after application of nystatin powder to protect skin.    Total time spent with patient visit at the skilled nursing facility was 35 min including patient visit, review of past records and phone call to patient contact. Greater than 50% of total time spent with counseling and coordinating care due to dementia, conversing with spouse about goals/next steps    Electronically signed by  Lindsay Brizuela CNP   [facility]  [phone] cell

## 2017-07-20 NOTE — PROGRESS NOTES
"[facility]    Chief Complaint   Patient presents with     Nursing Home Acute       HPI:    Maninder Loving is a 89 year old  (7/2/1924), who is being seen today for an episodic care visit at [facility]. Today's concern is:     Bilateral hearing loss, unspecified hearing loss type  Bilateral impacted cerumen     Very hard of hearing and this impacts his ability to understand and communicate in his surroundings. Nursing has been using debrox, reporting impacted bilateral cerumen    REVIEW OF SYSTEMS:  Unobtainable secondary to cognitive impairment and decreased hearing.     /60  Pulse 64  Temp 96.4  F (35.8  C)  Resp 18  Wt 163 lb (73.9 kg)  SpO2 95%  BMI 24.78 kg/m2  GENERAL APPEARANCE:  Alert, in no distress  CHEST/RESP:  respiratory effort normal , no respiratory distress  EAR EXAM: bilateral ears with impacted cerumen which is dark brown in color, cannot visualize TM  PSYCH:  Confused, disoriented and agitated, unable to understand and unwilling to cooperate.       ASSESSMENT/PLAN:  Bilateral hearing loss, unspecified hearing loss type  Bilateral impacted cerumen  Bilateral cerumen impaction, has had several days of debrox with no success in opening ear canals. Visit today to attempt flushing of ears. Written information provided to him, he read it and verbalizes \"I'd like to hear better\".  However, when flush attempted, he became agitated and violent, hitting out, turning his head violently from side to side, swearing and stating \"you are sticking needles in my ears, don't torture me\". Several attempts were made to calm him, provide comfort, explain process. He absolutely refused to cooperate.     This is significantly impacting his well being, care conference planned for next week with spouse to discuss goals, plans, and goals of care.       Electronically signed by  Lindsay Brizuela CNP   [facility]  [phone] cell   "

## 2017-07-21 PROBLEM — H61.23 BILATERAL IMPACTED CERUMEN: Status: ACTIVE | Noted: 2017-01-01

## 2017-07-21 PROBLEM — H91.93 BILATERAL HEARING LOSS, UNSPECIFIED HEARING LOSS TYPE: Status: ACTIVE | Noted: 2017-01-01

## 2017-07-27 NOTE — PROGRESS NOTES
"[facility]    Chief Complaint   Patient presents with     Nursing Home Acute       HPI:    Maninder Loving is a 89 year old  (7/2/1924), who is being seen today for an episodic care visit at [facility]. Today's concern is:     Anxiety  Bilateral hearing loss, unspecified hearing loss type  Bilateral impacted cerumen  Dementia with behavioral disturbance, unspecified dementia type  Generalized pain     Care conference today to discuss goals of care, next steps.     REVIEW OF SYSTEMS:  Unobtainable secondary to cognitive impairment or aphasia.    /70  Pulse 76  Temp 98  F (36.7  C)  Resp 16  Wt 168 lb (76.2 kg)  SpO2 94%  BMI 25.54 kg/m2  GENERAL APPEARANCE:  Alert, in no distress  CHEST/RESP:  respiratory effort normal, lung sounds  CTA , no respiratory distress  CV:  Rate reg, rhythm reg, no murmur, trace peripheral edema   PSYCH:  Anxious and uncooperative      ASSESSMENT/PLAN:     Anxiety  Bilateral hearing loss, unspecified hearing loss type  Bilateral impacted cerumen  Dementia with behavioral disturbance, unspecified dementia type  Generalized pain     Long discussion with spouse today at care conference to discuss goals of care, next steps,a nd to decide how best to manage Maninder's care needs. He refuses care frequently, does not allow personal cares, refuses medications and food, reports transfers with PAL lift as a \"torture device\"    1.ENT referral at [facility] to attend - attempt ear flushing diagnosis cerumen impaction  2. Increase oxycodone to 2.5 mg po TID Dx pain  3. DC Tylenol  4. Update NP with status on 7/31/17    Time 45 minutes for patient visit, discussion with spouse,  and review of medical records    Electronically signed by  Lindsay Brizuela CNP   [facility]  [phone] cell   "

## 2017-07-29 PROBLEM — R52 GENERALIZED PAIN: Status: ACTIVE | Noted: 2017-01-01

## 2017-08-06 NOTE — PROGRESS NOTES
"  [facility]    Chief Complaint   Patient presents with     shelter Regulatory       HPI:    Maninder Loving is a 89 year old  (7/2/1924), who is being seen today for a federally mandated E/M visit at [facility] .  HPI information obtained from: facility chart records, facility staff and [facility] chart review.     - resident seen and examined.  - Nursing staff has on concern today.   - Per NP's last note:\"...He refuses care frequently, does not allow personal cares, refuses medications and food, reports transfers with PAL lift as a \"torture device\",  Tylenol stopped, oxycodone increased, and a referral to ENT made for cerumen impactions.     ---------------------------------------    # Past Medical, social, family histories, medications, and allergies reviewed and updated  # Medications reviewed: Reviewed in the chart and EHR.    # Case Management:  I have reviewed the care plan and MDS and do agree with the plan. Patient's desire to return to the community is not present.  Information reviewed:  Medications, vital signs, orders, and nursing notes.    ROS:   Limited due to the Resident's dementia, otherwise negative except as in the HPI    Exam:  Vitals: /70  Pulse 76  Temp 98  F (36.7  C)  Resp 16  Ht 5' 8\" (1.727 m)  Wt 165 lb 6.4 oz (75 kg)  SpO2 94%  BMI 25.15 kg/m2  BMI= Body mass index is 25.15 kg/(m^2).  GENERAL APPEARANCE:  awake, so acute distress  RESP:  respiratory effort and palpation of chest normal, lungs clear to auscultation   CV:  Palpation and auscultation of heart done , regular rate and rhythm, no murmur, rub, or gallop, no edema  ABDOMEN:  normal bowel sounds, soft, nontender, no hepatosplenomegaly or other masses  M/S:   Gait and station abnormal Unsteady, on the wheel chair. Moves all limbs. 1+  SKIN:  Inspection of skin and subcutaneous tissue baseline, Palpation of skin and subcutaneous tissue baseline.    NEURO:   CN testing limited due " to patient's mentation. Resting tremor in right hand.   PSYCH:  oriented to name. Mood and affect appropriate    Lab/Diagnostic data:  No new lab to review.     ASSESSMENT/PLAN  Anxiety  - stable, o lorazepam prn.    Generalized pain  - on oxycodone, seems doing better now     Bilateral hearing loss, unspecified hearing loss type  - with recurrent cerumen impaction, has appointment with ENT, follow on the recommendations.     Frail elderly  - Significant  Deficits requiring NH placement. Requiring extensive assistance from nursing. Up for meals only o/w spends the day resting in bed      Dementia with behavioral disturbance, unspecified dementia type  - Continue to anticipate needs. Chronic condition, ongoing decline expected.   -  Continue to provide redirection and reassurance as needed. Maintain safe living situation with goals focused on comfort.      Orders:  - See above, otherwise, continue the rest of the current POC.     Electronically signed by:  Dyana Guerrero MD

## 2017-08-09 NOTE — PROGRESS NOTES
History of Present Illness - Maninder Loving is a 89 year old male with a history of dementia who presents today for cerumen removal. He does not wear hearing aids due to cerumen buildup preventing audiometric testing. He denies any prior trouble with his ears. He had an ear cleaning many years ago.    Past Medical History -   Patient Active Problem List   Diagnosis     Memory loss     Advanced directives, counseling/discussion     Falls frequently     Dementia with behavioral disturbance, unspecified dementia type     Urinary retention     Bilateral lower extremity edema     Anxiety     Candidiasis of skin     Bilateral hearing loss, unspecified hearing loss type     Bilateral impacted cerumen     Generalized pain       Current Medications -   Current Outpatient Prescriptions:      White Petrolatum-Mineral Oil (GENTEAL PM OP), Apply 1 drop to eye 2 times daily, Disp: , Rfl:      nystatin (MYCOSTATIN) 296508 UNIT/GM POWD, Apply topically 2 times daily Apply to groin and arm pit, Disp: , Rfl:      acetaminophen 500 MG CAPS, Take 1,000 mg by mouth 3 times daily, Disp: , Rfl:      OXYCODONE HCL PO, Take 2.5 mg by mouth At Bedtime , Disp: , Rfl:      sodium chloride (OCEAN) 0.65 % nasal spray, Spray 1 spray into both nostrils 4 times daily as needed for congestion, Disp: , Rfl:      Polyethyl Glycol-Propyl Glycol (SYSTANE PRESERVATIVE FREE OP), Apply 2 drops to eye 2 times daily And BID PRN, Disp: , Rfl:      VITAMIN D, CHOLECALCIFEROL, PO, Take 2,000 Units by mouth daily, Disp: , Rfl:      LORAZEPAM PO, Take 0.5 mg by mouth every 4 hours as needed for anxiety, Disp: , Rfl:      Furosemide (LASIX PO), Take 60 mg by mouth 2 times daily , Disp: , Rfl:      potassium chloride (KLOR-CON) 20 MEQ Packet, Take 40 mEq by mouth daily , Disp: , Rfl:      senna-docusate (SENOKOT-S;PERICOLACE) 8.6-50 MG per tablet, Take 1 tablet by mouth 2 times daily as needed , Disp: , Rfl:      tamsulosin (FLOMAX) 0.4 MG 24 hr capsule, Take 1  capsule (0.4 mg) by mouth daily, Disp: 60 capsule, Rfl:      COD LIVER OIL PO, Take 15 mLs by mouth daily, Disp: , Rfl:     Allergies -   Allergies   Allergen Reactions     Nka [No Known Allergies]        Social History -   Social History     Social History     Marital status:      Spouse name: N/A     Number of children: N/A     Years of education: N/A     Social History Main Topics     Smoking status: Never Smoker     Smokeless tobacco: Never Used     Alcohol use Yes     Drug use: Not on file     Sexual activity: Not on file     Other Topics Concern     Parent/Sibling W/ Cabg, Mi Or Angioplasty Before 65f 55m? No     Social History Narrative       Family History - History reviewed. No pertinent family history.    Review of Systems - As per HPI and PMHx, otherwise 7 system review of the head and neck negative. 10+ system review negative.    Physical Exam  /57 (BP Location: Right arm, Patient Position: Chair, Cuff Size: Adult Regular)  Pulse 69  Temp 98  F (36.7  C) (Oral)  Wt 72.6 kg (160 lb)  BMI 24.33 kg/m2  General - The patient is well nourished and well developed, and appears to have good nutritional status.  Alert and oriented to person, answers somewhat appropriately. Uncooperative with treatment.   Head and Face - Normocephalic and atraumatic, with no gross asymmetry noted of the contour of the facial features.  The facial nerve is intact, with strong symmetric movements.  Voice and Breathing - The patient was breathing comfortably without the use of accessory muscles. There was no wheezing, stridor, or stertor.  The patients voice was clear and strong, and had appropriate pitch and quality.  Ears - Bilateral pinna and EACs with normal appearing overlying skin. Tympanic membrane intact with good mobility on pneumatic otoscopy bilaterally. Bony landmarks of the ossicular chain are normal. The tympanic membranes are normal in appearance. No retraction, perforation, or masses.  No fluid or  purulence was seen in the external canal or the middle ear.   Eyes - Extraocular movements intact.  Sclera were not icteric or injected, conjunctiva were pink and moist.    Procedure: Cerumen Disimpaction  Diagnosis: Cerumen Impaction    Procedure and Findings  Ears - On examination of the ears, I found that the  ears were impacted with dense cerumen obscuring visualization of the right and left TM.  Therefore, I positioned the patient and I used the binocular surgical microscope to assist in visualization of the affected ear(s).  I began by using a cerumen loop to gently lift the edges of the cerumen mass away from the walls of the external canal.  Once I did this, I was able to suction away fragments of wax and debris using suction.  Once the mass was loose enough, the entire plug was pulled from the canal with microforceps.  The tympanic membrane was intact without sign of perforation or middle ear effusion and minimal ear canal trauma.       Assessment - Maninder Loving is a 89 year old male with bilateral sensorineural hearing loss as well as cerumen impaction. I have cleared the cerumen today. I advised him to followup promptly should he have any sudden changes in his hearing in the future.     This document serves as a record of the services and decisions personally performed and made by Dr. Cynthia Meza MD. It was created on his behalf by Denise Beckham, a trained medical scribe. The creation of this document is based the provider's statements to the medical scribe.  Denise Beckham 2:35 PM 8/9/2017    Provider:   The information in this document, created by the medical scribe for me, accurately reflects the services I personally performed and the decisions made by me. I have reviewed and approved this document for accuracy prior to leaving the patient care area.  Dr. Cynthia Meza MD 2:35 PM 8/9/2017    Dr. Cynthia Meza MD  Otolaryngology  [facility]

## 2017-08-09 NOTE — MR AVS SNAPSHOT
"              After Visit Summary   2017    Maninder Loving    MRN: [id number]           Patient Information     Date Of Birth          1924        Visit Information        Provider Department      2017 2:15 PM Cynthia Meza MD [facility]        Today's Diagnoses     Bilateral impacted cerumen    -  1    Bilateral hearing loss, unspecified hearing loss type          Care Instructions    Per physician's instructions            Follow-ups after your visit        Who to contact     If you have questions or need follow up information about today's clinic visit or your schedule please contact [facility] directly at [phone].  Normal or non-critical lab and imaging results will be communicated to you by MyChart, letter or phone within 4 business days after the clinic has received the results. If you do not hear from us within 7 days, please contact the clinic through [employer] or phone. If you have a critical or abnormal lab result, we will notify you by phone as soon as possible.  Submit refill requests through [employer] or call your pharmacy and they will forward the refill request to us. Please allow 3 business days for your refill to be completed.          Additional Information About Your Visit        MyChart Information     [employer] lets you send messages to your doctor, view your test results, renew your prescriptions, schedule appointments and more. To sign up, go to [email or web address] . Click on \"Log in\" on the left side of the screen, which will take you to the Welcome page. Then click on \"Sign up Now\" on the right side of the page.     You will be asked to enter the access code listed below, as well as some personal information. Please follow the directions to create your username and password.     Your access code is: [email or web address]  Expires: 2017  2:57 PM     Your access code will  in 90 days. If you need help or a new code, please call your [place] clinic " or [phone].        Care EveryWhere ID     This is your Care EveryWhere ID. This could be used by other organizations to access your [place] medical records  [id number]        Your Vitals Were     Pulse Temperature BMI (Body Mass Index)             69 98  F (36.7  C) (Oral) 24.33 kg/m2          Blood Pressure from Last 3 Encounters:   08/09/17 121/57   08/06/17 140/70   07/27/17 140/70    Weight from Last 3 Encounters:   08/09/17 72.6 kg (160 lb)   08/06/17 75 kg (165 lb 6.4 oz)   07/27/17 76.2 kg (168 lb)              We Performed the Following     Remove Josef        Primary Care Provider Office Phone # Fax #    Lindsay KORIN Brizuela [phone]       [address] [facility] [address]  [facility] [address]        Equal Access to Services     [facility]: Hadii aad ku hadasho Sochrissy, waaxda luqadaha, qaybta kaalmada adeegyada, brown haeys hayglen akhtar . So [facility] [phone].    ATENCIÓN: Si habla español, tiene a sommers disposición servicios gratuitos de asistencia lingüística. Jes al [phone].    We comply with applicable federal civil rights laws and [place] laws. We do not discriminate on the basis of race, color, national origin, age, disability sex, sexual orientation or gender identity.            Thank you!     Thank you for choosing [facility]  for your care. Our goal is always to provide you with excellent care. Hearing back from our patients is one way we can continue to improve our services. Please take a few minutes to complete the written survey that you may receive in the mail after your visit with us. Thank you!             Your Updated Medication List - Protect others around you: Learn how to safely use, store and throw away your medicines at www.disposemymeds.org.          This list is accurate as of: 8/9/17  2:57 PM.  Always use your most recent med list.                   Brand Name Dispense Instructions for use Diagnosis    acetaminophen 500 MG Caps       Take 1,000 mg by mouth 3 times daily        COD LIVER OIL PO      Take 15 mLs by mouth daily        GENTEAL PM OP      Apply 1 drop to eye 2 times daily        LASIX PO      Take 60 mg by mouth 2 times daily        LORAZEPAM PO      Take 0.5 mg by mouth every 4 hours as needed for anxiety        nystatin 987382 UNIT/GM Powd    MYCOSTATIN     Apply topically 2 times daily Apply to groin and arm pit        OXYCODONE HCL PO      Take 2.5 mg by mouth At Bedtime        potassium chloride 20 MEQ Packet    KLOR-CON     Take 40 mEq by mouth daily        senna-docusate 8.6-50 MG per tablet    SENOKOT-S;PERICOLACE     Take 1 tablet by mouth 2 times daily as needed        sodium chloride 0.65 % nasal spray    OCEAN     Spray 1 spray into both nostrils 4 times daily as needed for congestion        SYSTANE PRESERVATIVE FREE OP      Apply 2 drops to eye 2 times daily And BID PRN        tamsulosin 0.4 MG capsule    FLOMAX    60 capsule    Take 1 capsule (0.4 mg) by mouth daily    Urinary retention with incomplete bladder emptying       VITAMIN D (CHOLECALCIFEROL) PO      Take 2,000 Units by mouth daily

## 2017-10-16 PROBLEM — F43.23 ADJUSTMENT DISORDER WITH MIXED ANXIETY AND DEPRESSED MOOD: Status: ACTIVE | Noted: 2017-01-01

## 2017-10-16 NOTE — PROGRESS NOTES
[facility]    Chief Complaint   Patient presents with     FDC Regulatory       HPI:    Maninder Loving is a 89 year old  (7/2/1924), who is being seen today for a federally mandated E/M visit at [facility] .  HPI information obtained from: facility chart records, facility staff and patient report. Today's concerns are:  Dementia with behavioral disturbance, unspecified dementia type  Generalized pain  Adjustment disorder with mixed anxiety and depressed mood  Anxiety  Continues to be very confused and often resistive to care, documented as refusing cares 16x in last month, threatening/cursing at others 24 x in the last month, biting/scratching x1 in the last month, striking out 5 x in the last month, pacing 1x this month, delusions and anxious behavior 12 x in the last month.     He was started on oxycodone about 2 months ago to see if generalized pain control would help his behaviors improve but they have not. Now needs review of medical records to determine next steps.       ALLERGIES: Nka [no known allergies]  PAST MEDICAL HISTORY:  has a past medical history of Arthritis; Dementia; Generalized pain (7/29/2017); [place] (hard of hearing); and Short-term memory loss. He also has no past medical history of Congestive heart failure, unspecified; COPD (chronic obstructive pulmonary disease) (H); Coronary artery disease; CVA (cerebral infarction); Depressive disorder; Diabetes (H); History of blood transfusion; Hypertension; Malignant hyperthermia; PONV (postoperative nausea and vomiting); Thyroid disease; or Uncomplicated asthma.  PAST SURGICAL HISTORY:  has a past surgical history that includes adenoidectomy; hernia repair; and Herniorrhaphy inguinal (Right, 10/22/2015).  FAMILY HISTORY: family history is not on file.  SOCIAL HISTORY:  reports that he has never smoked. He has never used smokeless tobacco. He reports that he drinks alcohol.    MEDICATIONS:  Current Outpatient  Prescriptions   Medication Sig Dispense Refill     Mirtazapine (REMERON PO) Take 7.5 mg by mouth At Bedtime       White Petrolatum-Mineral Oil (GENTEAL PM OP) Apply 1 drop to eye 2 times daily       nystatin (MYCOSTATIN) 518138 UNIT/GM POWD Apply topically 2 times daily Apply to groin and arm pit       acetaminophen 500 MG CAPS Take 1,000 mg by mouth 3 times daily       OXYCODONE HCL PO Take 2.5 mg by mouth 3 times daily as needed        sodium chloride (OCEAN) 0.65 % nasal spray Spray 1 spray into both nostrils 4 times daily as needed for congestion       Polyethyl Glycol-Propyl Glycol (SYSTANE PRESERVATIVE FREE OP) Apply 2 drops to eye 2 times daily And BID PRN       VITAMIN D, CHOLECALCIFEROL, PO Take 2,000 Units by mouth daily       Furosemide (LASIX PO) Take 60 mg by mouth 2 times daily        potassium chloride (KLOR-CON) 20 MEQ Packet Take 40 mEq by mouth daily        senna-docusate (SENOKOT-S;PERICOLACE) 8.6-50 MG per tablet Take 1 tablet by mouth 2 times daily as needed        tamsulosin (FLOMAX) 0.4 MG 24 hr capsule Take 1 capsule (0.4 mg) by mouth daily 60 capsule      COD LIVER OIL PO Take 15 mLs by mouth daily       Medications reviewed:  Medications reconciled to facility chart and changes were made to reflect current medications as identified as above med list. Below are the changes that were made:   Medications stopped since last EPIC medication reconciliation:   Medications Discontinued During This Encounter   Medication Reason     LORAZEPAM PO        Medications started since last [facility] medication reconciliation:  Orders Placed This Encounter   Medications     Mirtazapine (REMERON PO)     Sig: Take 7.5 mg by mouth At Bedtime         Case Management:  I have reviewed the care plan and MDS and do agree with the plan. Patient's desire to return to the community is not assessible due to cognitive impairment.  Information reviewed:  Medications, vital signs, orders, and nursing  "notes.    ROS:  Unobtainable secondary to cognitive impairment or aphasia, but today pt reports no new concerns and states he feels \"strong\"    Exam:  Vitals: /75  Pulse 64  Temp 98.1  F (36.7  C)  Resp 17  Wt 164 lb (74.4 kg)  SpO2 94%  BMI 24.94 kg/m2  BMI= Body mass index is 24.94 kg/(m^2).  GENERAL APPEARANCE:  Alert, in no acute distress  HEAD:  Normal, normocephalic, atraumatic  EYE EXAM: normal external eye, conjunctiva, lids, EASTON  NECK EXAM: supple, no JVD  CHEST/RESP:  respiratory effort normal, lung sounds CTA , no respiratory distress  CV:  Rate reg, rhythm reg, no murmur, trace peripheral edema wearing compression garments  M/S:   extremities normal, gait abnormal-does not ambulate and transfer with PAL lift, normal muscle tone, and range of motion normal   NEUROLOGIC EXAM: Normal gross motor movement, tone and coordination. No tremor.  Cranial nerves 2-12 are normal tested and grossly at patient's baseline  PSYCH:  Alert and oriented to self and family, affect anxious , judgement impaired by cognitive losses     Lab/Diagnostic data:  All labs reviewed, none recent.      ASSESSMENT/PLAN  Dementia with behavioral disturbance, unspecified dementia type  Generalized pain  Adjustment disorder with mixed anxiety and depressed mood  Anxiety  Chronic condition, ongoing decline expected. Maintain safe living situation with goals focused on comfort.  Oxycodone has not been very helpful but do note mild improvement in mood with addition of oxycodone. He is pleasant if not disturbed, but still considers the PAL lift to be a \"torture device\".  Will trial low dose remeron, as discussed at care conference in July.       Orders:  Remeron 7.5 mg po QHS  Diagnosis depression with anxiety      Electronically signed by:  Lindsay Brizuela CNP   [facility]  [phone] cell     "

## 2017-10-20 NOTE — PROGRESS NOTES
[facility]    Chief Complaint   Patient presents with     Nursing Home Acute       HPI:    Maninder Loving is a 89 year old  (7/2/1924), who is being seen today for an episodic care visit at [facility] .  HPI information obtained from: facility chart records, facility staff and patient report.Today's concern is:     Dementia with behavioral disturbance, unspecified dementia type  Adjustment disorder with mixed anxiety and depressed mood     Maninder was supposed to be started on remeron last week, but it appears that he has not yet received this medication. His spouse had approved for 1 week of medication. Today, nursing notes no new improvements in his anxiety which is to be expected since he has not started the medication. Spouse, Sue, notes no new concerns except for a new cough since URI a couple of weeks ago.     ALLERGIES: Nka [no known allergies]  Past Medical, Surgical, Family and Social History reviewed and updated in EPIC.    Current Outpatient Prescriptions   Medication Sig Dispense Refill     Mirtazapine (REMERON PO) Take 7.5 mg by mouth At Bedtime       White Petrolatum-Mineral Oil (GENTEAL PM OP) Apply 1 drop to eye 2 times daily       nystatin (MYCOSTATIN) 374665 UNIT/GM POWD Apply topically 2 times daily Apply to groin and arm pit       acetaminophen 500 MG CAPS Take 1,000 mg by mouth 3 times daily       OXYCODONE HCL PO Take 2.5 mg by mouth 3 times daily as needed        sodium chloride (OCEAN) 0.65 % nasal spray Spray 1 spray into both nostrils 4 times daily as needed for congestion       Polyethyl Glycol-Propyl Glycol (SYSTANE PRESERVATIVE FREE OP) Apply 2 drops to eye 2 times daily And BID PRN       VITAMIN D, CHOLECALCIFEROL, PO Take 2,000 Units by mouth daily       Furosemide (LASIX PO) Take 60 mg by mouth 2 times daily        potassium chloride (KLOR-CON) 20 MEQ Packet Take 40 mEq by mouth daily        senna-docusate (SENOKOT-S;PERICOLACE) 8.6-50 MG per tablet  Take 1 tablet by mouth 2 times daily as needed        tamsulosin (FLOMAX) 0.4 MG 24 hr capsule Take 1 capsule (0.4 mg) by mouth daily 60 capsule      COD LIVER OIL PO Take 15 mLs by mouth daily       Medications reviewed:  Medications reconciled to facility chart and changes were made to reflect current medications as identified as above med list. Below are the changes that were made:   Medications stopped since last EPIC medication reconciliation:   There are no discontinued medications.    Medications started since last [facility] medication reconciliation:  No orders of the defined types were placed in this encounter.        REVIEW OF SYSTEMS:  4 point ROS including Respiratory, CV, GI and , other than that noted in the HPI,  is negative    Physical Exam:  /75  Pulse 64  Temp 98.1  F (36.7  C)  Resp 17  Wt 164 lb (74.4 kg)  SpO2 94%  BMI 24.94 kg/m2  GENERAL APPEARANCE:  Alert, in no distress  HEAD:  Normal, normocephalic, atraumatic  EYE EXAM: normal external eye, conjunctiva, lids, EASTON  NECK EXAM: stiff, no JVD  CHEST/RESP:  respiratory effort  normal, lung sounds CTA , no respiratory distress  CV:  Rate reg, rhythm reg, no murmur, 1-2+ peripheral edema   M/S:   extremities normal, gait abnormal-unable to ambulate and uses wheelchair for mobility, normal muscle tone, and range of motion limited by arthritis  NEUROLOGIC EXAM: Normal gross motor movement, tone and coordination. No tremor.  Cranial nerves 2-12 are normal tested and grossly at patient's baseline  PSYCH:  Alert and oriented to self and surroundings with forgetfulness , affect anxious and paranoid, judgement impaired by cognitive losses     Recent Labs:  All labs reviewed, none recent.      Assessment/Plan:  Dementia with behavioral disturbance, unspecified dementia type  Adjustment disorder with mixed anxiety and depressed mood  No adjustments in medications now, he has not yet received any remeron. Will have nursing staff check in to this.        Orders:  1. Please call pharmacy to obtain Remeron -Not Yet Received  2. Mucinex  MG, 1 tab BID PO dx: cough with congestion  3. Behavior monitoring x2 week, document refusal of cares, swearing, etc.     Electronically signed by  Lindsay Brizuela CNP   [facility]  [phone] cell

## 2017-11-08 NOTE — LETTER
"    11/8/2017        RE: Maninder Loving  [address] [address]        [facility]    Chief Complaint   Patient presents with     Nursing Home Acute       HPI:    Maninder Loving is a 89 year old  (7/2/1924), who is being seen today for an episodic care visit at [facility] .  HPI information obtained from: facility chart records, facility staff, patient report and [facility] chart review.Today's concern is:     Dementia with behavioral disturbance, unspecified dementia type  Adjustment disorder with mixed anxiety and depressed mood  Generalized pain     Review of records and condition today due to recent start of remeron. Nursing reports patient is much more lethargic, poor po intake and sleeping most of each day. No improvement noted in behaviors of yelling out and resisting care. Today,  Maninder is alert, but speech is garbled and he is unable to answer simple questions-states \"I'm 89 years old\"    ALLERGIES: Nka [no known allergies]  Past Medical, Surgical, Family and Social History reviewed and updated in [facility].    Current Outpatient Prescriptions   Medication Sig Dispense Refill     guaiFENesin (MUCINEX) 600 MG 12 hr tablet Take 600 mg by mouth 2 times daily       Mirtazapine (REMERON PO) Take 7.5 mg by mouth At Bedtime       White Petrolatum-Mineral Oil (GENTEAL PM OP) Apply 1 drop to eye 2 times daily       nystatin (MYCOSTATIN) 980014 UNIT/GM POWD Apply topically 2 times daily Apply to groin and arm pit       OXYCODONE HCL PO Take 2.5 mg by mouth 3 times daily as needed        sodium chloride (OCEAN) 0.65 % nasal spray Spray 1 spray into both nostrils 4 times daily as needed for congestion       Polyethyl Glycol-Propyl Glycol (SYSTANE PRESERVATIVE FREE OP) Apply 2 drops to eye 2 times daily And BID PRN       VITAMIN D, CHOLECALCIFEROL, PO Take 2,000 Units by mouth daily       Furosemide (LASIX PO) Take 60 mg by mouth 2 times daily        potassium chloride " (KLOR-CON) 20 MEQ Packet Take 40 mEq by mouth daily        senna-docusate (SENOKOT-S;PERICOLACE) 8.6-50 MG per tablet Take 1 tablet by mouth 2 times daily as needed        tamsulosin (FLOMAX) 0.4 MG 24 hr capsule Take 1 capsule (0.4 mg) by mouth daily 60 capsule      COD LIVER OIL PO Take 15 mLs by mouth daily       Medications reviewed:  Medications reconciled to facility chart and changes were made to reflect current medications as identified as above med list. Below are the changes that were made:   Medications stopped since last EPIC medication reconciliation:   Medications Discontinued During This Encounter   Medication Reason     acetaminophen 500 MG CAPS        Medications started since last [facility] medication reconciliation:  No orders of the defined types were placed in this encounter.      REVIEW OF SYSTEMS:  Unobtainable secondary to cognitive impairment or aphasia.    Physical Exam:  /60  Pulse 64  Temp 97.5  F (36.4  C)  Resp 18  Wt 161 lb (73 kg)  SpO2 95%  BMI 24.48 kg/m2  GENERAL APPEARANCE:  Alert, in no distress, drifts off to sleep easily  HEAD:  Normal, normocephalic, atraumatic  EYE EXAM: normal external eye, conjunctiva, lids, EASTON  NECK EXAM: supple, no JVD  CHEST/RESP:  respiratory effort normal, lung sounds CTA , no respiratory distress  CV:  Rate reg, rhythm reg, no murmur, no peripheral edema   M/S:   extremities normal, gait abnormal-does not ambulate , normal muscle tone, and range of motion limited by dementia  NEUROLOGIC EXAM: Normal gross motor movement, tone and coordination. No tremor.  PSYCH:  Minimally alert and unable to determine cognition, increased confusion    Recent Labs:  All labs reviewed, none recent.      Assessment/Plan:  Dementia with behavioral disturbance, unspecified dementia type  Increased confusion over hte last 2-3 weeks with increased sleep and decreased interaction with environment, no improvement in resistance to physical cares    Adjustment disorder  with mixed anxiety and depressed mood  Sleepy, decreased alertness    Generalized pain  No observed pain today.     I attempted to call spouse, Sue, but neither phone number was answered and no answering machine was available.       Orders:  1. DC remeron - over sedated  2. DC scheduled mucinex  3. Mucinex  mg po BID prn cough      Electronically signed by  Lindsay Brizuela CNP   [facility]  [phone] cell         Sincerely,        KORIN Watson CNP

## 2017-11-08 NOTE — PROGRESS NOTES
"[facility]    Chief Complaint   Patient presents with     Nursing Home Acute       HPI:    Maninder Loving is a 89 year old  (7/2/1924), who is being seen today for an episodic care visit at [facility] .  HPI information obtained from: facility chart records, facility staff, patient report and [facility] chart review.Today's concern is:     Dementia with behavioral disturbance, unspecified dementia type  Adjustment disorder with mixed anxiety and depressed mood  Generalized pain     Review of records and condition today due to recent start of remeron. Nursing reports patient is much more lethargic, poor po intake and sleeping most of each day. No improvement noted in behaviors of yelling out and resisting care. Today,  Maninder is alert, but speech is garbled and he is unable to answer simple questions-states \"I'm 89 years old\"    ALLERGIES: Nka [no known allergies]  Past Medical, Surgical, Family and Social History reviewed and updated in [facility].    Current Outpatient Prescriptions   Medication Sig Dispense Refill     guaiFENesin (MUCINEX) 600 MG 12 hr tablet Take 600 mg by mouth 2 times daily       Mirtazapine (REMERON PO) Take 7.5 mg by mouth At Bedtime       White Petrolatum-Mineral Oil (GENTEAL PM OP) Apply 1 drop to eye 2 times daily       nystatin (MYCOSTATIN) 891649 UNIT/GM POWD Apply topically 2 times daily Apply to groin and arm pit       OXYCODONE HCL PO Take 2.5 mg by mouth 3 times daily as needed        sodium chloride (OCEAN) 0.65 % nasal spray Spray 1 spray into both nostrils 4 times daily as needed for congestion       Polyethyl Glycol-Propyl Glycol (SYSTANE PRESERVATIVE FREE OP) Apply 2 drops to eye 2 times daily And BID PRN       VITAMIN D, CHOLECALCIFEROL, PO Take 2,000 Units by mouth daily       Furosemide (LASIX PO) Take 60 mg by mouth 2 times daily        potassium chloride (KLOR-CON) 20 MEQ Packet Take 40 mEq by mouth daily        senna-docusate " (SENOKOT-S;PERICOLACE) 8.6-50 MG per tablet Take 1 tablet by mouth 2 times daily as needed        tamsulosin (FLOMAX) 0.4 MG 24 hr capsule Take 1 capsule (0.4 mg) by mouth daily 60 capsule      COD LIVER OIL PO Take 15 mLs by mouth daily       Medications reviewed:  Medications reconciled to facility chart and changes were made to reflect current medications as identified as above med list. Below are the changes that were made:   Medications stopped since last EPIC medication reconciliation:   Medications Discontinued During This Encounter   Medication Reason     acetaminophen 500 MG CAPS        Medications started since last [facility] medication reconciliation:  No orders of the defined types were placed in this encounter.      REVIEW OF SYSTEMS:  Unobtainable secondary to cognitive impairment or aphasia.    Physical Exam:  /60  Pulse 64  Temp 97.5  F (36.4  C)  Resp 18  Wt 161 lb (73 kg)  SpO2 95%  BMI 24.48 kg/m2  GENERAL APPEARANCE:  Alert, in no distress, drifts off to sleep easily  HEAD:  Normal, normocephalic, atraumatic  EYE EXAM: normal external eye, conjunctiva, lids, EASTON  NECK EXAM: supple, no JVD  CHEST/RESP:  respiratory effort normal, lung sounds CTA , no respiratory distress  CV:  Rate reg, rhythm reg, no murmur, no peripheral edema   M/S:   extremities normal, gait abnormal-does not ambulate , normal muscle tone, and range of motion limited by dementia  NEUROLOGIC EXAM: Normal gross motor movement, tone and coordination. No tremor.  PSYCH:  Minimally alert and unable to determine cognition, increased confusion    Recent Labs:  All labs reviewed, none recent.      Assessment/Plan:  Dementia with behavioral disturbance, unspecified dementia type  Increased confusion over hte last 2-3 weeks with increased sleep and decreased interaction with environment, no improvement in resistance to physical cares    Adjustment disorder with mixed anxiety and depressed mood  Sleepy, decreased  alertness    Generalized pain  No observed pain today.     I attempted to call spouse, Sue, but neither phone number was answered and no answering machine was available.       Orders:  1. DC remeron - over sedated  2. DC scheduled mucinex  3. Mucinex  mg po BID prn cough      Electronically signed by  Lindsay Brizuela CNP   [facility]  [phone] cell
